# Patient Record
Sex: MALE | Race: WHITE | NOT HISPANIC OR LATINO
[De-identification: names, ages, dates, MRNs, and addresses within clinical notes are randomized per-mention and may not be internally consistent; named-entity substitution may affect disease eponyms.]

---

## 2020-10-24 ENCOUNTER — TRANSCRIPTION ENCOUNTER (OUTPATIENT)
Age: 25
End: 2020-10-24

## 2020-11-01 ENCOUNTER — TRANSCRIPTION ENCOUNTER (OUTPATIENT)
Age: 25
End: 2020-11-01

## 2021-04-11 ENCOUNTER — TRANSCRIPTION ENCOUNTER (OUTPATIENT)
Age: 26
End: 2021-04-11

## 2021-09-03 ENCOUNTER — TRANSCRIPTION ENCOUNTER (OUTPATIENT)
Age: 26
End: 2021-09-03

## 2022-04-19 ENCOUNTER — EMERGENCY (EMERGENCY)
Facility: HOSPITAL | Age: 27
LOS: 0 days | Discharge: HOME | End: 2022-04-19
Attending: EMERGENCY MEDICINE | Admitting: EMERGENCY MEDICINE
Payer: COMMERCIAL

## 2022-04-19 VITALS
SYSTOLIC BLOOD PRESSURE: 159 MMHG | TEMPERATURE: 98 F | RESPIRATION RATE: 18 BRPM | HEART RATE: 64 BPM | WEIGHT: 225.09 LBS | DIASTOLIC BLOOD PRESSURE: 81 MMHG | OXYGEN SATURATION: 100 %

## 2022-04-19 DIAGNOSIS — N43.3 HYDROCELE, UNSPECIFIED: ICD-10-CM

## 2022-04-19 DIAGNOSIS — N50.3 CYST OF EPIDIDYMIS: ICD-10-CM

## 2022-04-19 DIAGNOSIS — N50.811 RIGHT TESTICULAR PAIN: ICD-10-CM

## 2022-04-19 DIAGNOSIS — R35.0 FREQUENCY OF MICTURITION: ICD-10-CM

## 2022-04-19 LAB
APPEARANCE UR: CLEAR — SIGNIFICANT CHANGE UP
BILIRUB UR-MCNC: NEGATIVE — SIGNIFICANT CHANGE UP
COLOR SPEC: YELLOW — SIGNIFICANT CHANGE UP
DIFF PNL FLD: NEGATIVE — SIGNIFICANT CHANGE UP
GLUCOSE UR QL: NEGATIVE — SIGNIFICANT CHANGE UP
KETONES UR-MCNC: NEGATIVE — SIGNIFICANT CHANGE UP
LEUKOCYTE ESTERASE UR-ACNC: NEGATIVE — SIGNIFICANT CHANGE UP
NITRITE UR-MCNC: NEGATIVE — SIGNIFICANT CHANGE UP
PH UR: 6 — SIGNIFICANT CHANGE UP (ref 5–8)
PROT UR-MCNC: SIGNIFICANT CHANGE UP
SP GR SPEC: 1.03 — SIGNIFICANT CHANGE UP (ref 1.01–1.03)
UROBILINOGEN FLD QL: SIGNIFICANT CHANGE UP

## 2022-04-19 PROCEDURE — 76870 US EXAM SCROTUM: CPT | Mod: 26

## 2022-04-19 PROCEDURE — 99284 EMERGENCY DEPT VISIT MOD MDM: CPT

## 2022-04-19 NOTE — ED PROVIDER NOTE - PROGRESS NOTE DETAILS
EP: Testicular sono done, no acute pathology found.  Findings of right epididymal cyst and bilateral hydrocele discussed with the patient, he was advised to follow-up with urologist, UA is negative as well.  The patient is stable for discharge home.  He verbalized understanding and is amenable to plan.  Strict return precautions given.

## 2022-04-19 NOTE — ED PROVIDER NOTE - PHYSICAL EXAMINATION
GENERAL: Well-nourished, Well-developed. NAD.  HEAD: No visible or palpable bumps or hematomas. No ecchymosis behind ears B/L.  Eyes: PERRLA, EOMI. No asymmetry. No nystagmus. No conjunctival injection. Non-icteric sclera.  ENMT: MMM.   Neck: Supple. FROM  CVS: RRR. Normal S1,S2. No murmurs appreciated on auscultation   RESP: No use of accessory muscles. Chest rise symmetrical with good expansion. Lungs clear to auscultation B/L. No wheezing, rales, or rhonchi auscultated.  GI: Normal auscultation of bowel sounds in all 4 quadrants. Soft, Nontender, Nondistended. No guarding or rebound tenderness. No CVAT B/L.  Skin: Warm, Dry. No rashes or lesions. Good cap refill < 2 sec B/L.  : Testes non-tender and descended B/L. No penile sores. No erythema. No palpable LNs. no hernia. (+)cremasteric refelx b/l. Chaperoned by Dr Cifuentes

## 2022-04-19 NOTE — ED PROVIDER NOTE - NS ED ROS FT
Constitutional: (-) fever (-) malaise (-) diaphoresis (-) chills   Eyes: (-) visual changes (-) eye pain (-) eye discharge (-) photophobia (-) FB sensation  Cardiac: (-) chest pain  (-) palpitations (-) syncope (-) edema  Respiratory: (-) cough (-) SOB (-) GUILLERMO  GI: (-) nausea (-) vomiting (-) diarrhea (-) abdominal pain   : (-) dysuria (+) increased frequency  (-) hematuria (-) incontinence (+)testicular pain  MS: (-) back pain  Neuro: (-) headache (-) dizziness (-) numbness/tingling to extremities B/L (-) weakness   Skin: (-) rash (-) laceration    Except as documented in the HPI, all other systems are negative.

## 2022-04-19 NOTE — ED PROVIDER NOTE - ATTENDING APP SHARED VISIT CONTRIBUTION OF CARE
26-year-old male without any pertinent past medical history presenting for evaluation of intermittent mild right testicular pain for the past 2 days.  Pain resolves almost immediately after repositioning of the testicle, it is nonradiating.  Patient denies any penile discharge or lesions, no history of STIs, no dysuria, abdominal or flank pain.  He went to urgent care center and was referred to the emergency room.  Denies any pain at present. In addition, he reports urinary frequency for the past few days, also reports increased fluid intake.  Well-appearing well-nourished young male in NAD, head AT/NC, PERRL, pink conjunctivae,  mmm, nml work of breathing, lungs CTA b/l, equal air entry, RRR, well-perfused extremities, distal pulses intact, abdomen soft, NT/ND, BS present in all quadrants, no midline spine or CVA ttp, no leg edema or unilateral calf swelling, normal male external genitalia, no testicular tenderness to palpation, no scrotal masses or lesions, no inguinal lymphadenopathy or palpable masses, cremasteric reflex present bilaterally ( exam was done by me in the presence of GIUSEPPE Knowles), A&Ox3, no focal neuro deficits, nml mood and affect.  Plan: UA, GC and Chlamydia testing, testicular sono, reassess.  Patient and his mother are amenable to plan.

## 2022-04-19 NOTE — ED PROVIDER NOTE - NSFOLLOWUPINSTRUCTIONS_ED_ALL_ED_FT
Our Emergency Department Referral Coordinators will be reaching out ot you in the next 24-48 hours from 9:00am to 5:00pm (Monday to Friday) with a follow up appointment. Please expect a phone call from the hospital in that time frame. If you do not receive a call or if you have any questions or concerns, you can reach them at [phone] or [phone].      Hydrocele    WHAT YOU NEED TO KNOW:    A hydrocele is a collection of fluid inside the scrotum. The scrotum holds the testicles. Hydroceles are simple or communicating. A simple hydrocele stays the same size. A communicating hydrocele gets bigger and smaller as fluid flows into and out of the scrotum.    DISCHARGE INSTRUCTIONS:    Medicines:     Pain medicine:     You may need medicine to take away or decrease pain.   Learn how to take your medicine. Ask what medicine and how much you should take. Be sure you know how, when, and how often to take it.      Do not wait until the pain is severe before you take your medicine. Tell your healthcare provider if your pain does not decrease.      Pain medicine can make you dizzy or sleepy. Prevent falls by calling someone when you get out of bed or if you need help.      Take your medicine as directed. Contact your healthcare provider if you think your medicine is not helping or if you have side effects. Tell him of her if you are allergic to any medicine. Keep a list of the medicines, vitamins, and herbs you take. Include the amounts, and when and why you take them. Bring the list or the pill bottles to follow-up visits. Carry your medicine list with you in case of an emergency.    Follow up with your healthcare provider or urologist as directed: Write down your questions so you remember to ask them during your visits.     Support: You may need to wear a fabric support device similar to a jock strap to decrease swelling.    Contact your healthcare provider or urologist if:     The swelling gets worse or does not go away.      You have questions or concerns about your condition or care.    Return to the emergency department if:     You have severe pain in your scrotum.      You have a fever and your scrotum is red and swollen.

## 2022-04-19 NOTE — ED PROVIDER NOTE - OBJECTIVE STATEMENT
25 yo M no pmhx presenting for evaluation of intermittent, non radiating, mild right sided testicular pain x 2 days. Pt reports pain is mild, dull/achy, states pain is alleviated at times when he repositions testicle, no provoking factors. Pt is sexually active with 1 partner without protection. Admits to increased frequency. Denies any trauma, fever, chills, testicular discharge, dysuria, hematuria, abd pain.

## 2022-04-19 NOTE — ED ADULT TRIAGE NOTE - CHIEF COMPLAINT QUOTE
Pt c/o intermittent sharp pains to right testicle since Sunday. Pt denies injury to area, denies fever. Pt reports increased urinary frequency since pain began denies other urinary symptoms.

## 2022-04-19 NOTE — ED PROVIDER NOTE - NS ED ATTENDING STATEMENT MOD
This was a shared visit with the JEAN-PAUL. I reviewed and verified the documentation and independently performed the documented:

## 2022-04-19 NOTE — ED PROVIDER NOTE - CARE PROVIDER_API CALL
Abbe Espinoza)  Urology  [address] [place] [address]  Phone: [phone]  Fax: [phone]  Follow Up Time: 4-6 Days

## 2022-04-19 NOTE — ED ADULT NURSE NOTE - OBJECTIVE STATEMENT
Pt presented to ED c/o intermittent sharp pains to right testicle since Sunday. Pt denies injury to area, denies fever. Pt reports increased urinary frequency since pain began denies other urinary symptoms.

## 2022-04-20 ENCOUNTER — TRANSCRIPTION ENCOUNTER (OUTPATIENT)
Age: 27
End: 2022-04-20

## 2022-04-20 LAB
C TRACH RRNA SPEC QL NAA+PROBE: SIGNIFICANT CHANGE UP
CULTURE RESULTS: NO GROWTH — SIGNIFICANT CHANGE UP
N GONORRHOEA RRNA SPEC QL NAA+PROBE: SIGNIFICANT CHANGE UP
SPECIMEN SOURCE: SIGNIFICANT CHANGE UP
SPECIMEN SOURCE: SIGNIFICANT CHANGE UP

## 2022-05-06 ENCOUNTER — APPOINTMENT (OUTPATIENT)
Dept: UROLOGY | Facility: CLINIC | Age: 27
End: 2022-05-06

## 2022-08-22 ENCOUNTER — NON-APPOINTMENT (OUTPATIENT)
Age: 27
End: 2022-08-22